# Patient Record
Sex: MALE | Race: WHITE | Employment: FULL TIME | URBAN - METROPOLITAN AREA
[De-identification: names, ages, dates, MRNs, and addresses within clinical notes are randomized per-mention and may not be internally consistent; named-entity substitution may affect disease eponyms.]

---

## 2023-09-18 ENCOUNTER — APPOINTMENT (OUTPATIENT)
Facility: HOSPITAL | Age: 58
DRG: 312 | End: 2023-09-18
Payer: OTHER GOVERNMENT

## 2023-09-18 ENCOUNTER — APPOINTMENT (OUTPATIENT)
Dept: VASCULAR SURGERY | Facility: HOSPITAL | Age: 58
DRG: 312 | End: 2023-09-18
Attending: INTERNAL MEDICINE
Payer: OTHER GOVERNMENT

## 2023-09-18 ENCOUNTER — HOSPITAL ENCOUNTER (INPATIENT)
Facility: HOSPITAL | Age: 58
LOS: 1 days | Discharge: HOME | DRG: 312 | End: 2023-09-19
Attending: STUDENT IN AN ORGANIZED HEALTH CARE EDUCATION/TRAINING PROGRAM | Admitting: INTERNAL MEDICINE
Payer: OTHER GOVERNMENT

## 2023-09-18 DIAGNOSIS — R42 VERTIGO: Primary | ICD-10-CM

## 2023-09-18 DIAGNOSIS — R55 NEAR SYNCOPE: ICD-10-CM

## 2023-09-18 DIAGNOSIS — R42 DIZZINESS: ICD-10-CM

## 2023-09-18 DIAGNOSIS — R26.2 AMBULATORY DYSFUNCTION: ICD-10-CM

## 2023-09-18 PROBLEM — I25.10 CAD (CORONARY ARTERY DISEASE): Status: ACTIVE | Noted: 2023-09-18

## 2023-09-18 LAB
ANION GAP SERPL CALC-SCNC: 13 MMOL/L (ref 5–15)
BASOPHILS # BLD: 0.1 K/UL (ref 0–1)
BASOPHILS NFR BLD: 1 % (ref 0–1)
BUN SERPL-MCNC: 11 MG/DL (ref 8–23)
BUN/CREAT SERPL: 9 (ref 12–20)
CALCIUM SERPL-MCNC: 9.9 MG/DL (ref 8.8–10.2)
CHLORIDE SERPL-SCNC: 104 MMOL/L (ref 98–107)
CO2 SERPL-SCNC: 24 MMOL/L (ref 22–29)
COMMENT:: NORMAL
CREAT SERPL-MCNC: 1.18 MG/DL (ref 0.7–1.2)
DIFFERENTIAL METHOD BLD: ABNORMAL
EOSINOPHIL # BLD: 0.3 K/UL (ref 0–0.4)
EOSINOPHIL NFR BLD: 2 % (ref 0–7)
ERYTHROCYTE [DISTWIDTH] IN BLOOD BY AUTOMATED COUNT: 12.7 % (ref 11.5–14.5)
GLUCOSE BLD STRIP.AUTO-MCNC: 95 MG/DL (ref 65–117)
GLUCOSE SERPL-MCNC: 100 MG/DL (ref 65–100)
HCT VFR BLD AUTO: 49.5 % (ref 36.6–50.3)
HGB BLD-MCNC: 17.4 G/DL (ref 12.1–17)
IMM GRANULOCYTES # BLD AUTO: 0 K/UL (ref 0–0.04)
IMM GRANULOCYTES NFR BLD AUTO: 0 % (ref 0–0.5)
LYMPHOCYTES # BLD: 2.7 K/UL (ref 0.8–3.5)
LYMPHOCYTES NFR BLD: 23 % (ref 12–49)
MCH RBC QN AUTO: 31.6 PG (ref 26–34)
MCHC RBC AUTO-ENTMCNC: 35.2 G/DL (ref 30–36.5)
MCV RBC AUTO: 89.8 FL (ref 80–99)
MONOCYTES # BLD: 0.8 K/UL (ref 0–1)
MONOCYTES NFR BLD: 6 % (ref 5–13)
NEUTS SEG # BLD: 7.9 K/UL (ref 1.8–8)
NEUTS SEG NFR BLD: 68 % (ref 32–75)
NRBC # BLD: 0 K/UL (ref 0–0.01)
NRBC BLD-RTO: 0 PER 100 WBC
PLATELET # BLD AUTO: 261 K/UL (ref 150–400)
PMV BLD AUTO: 9.1 FL (ref 8.9–12.9)
POTASSIUM SERPL-SCNC: 4 MMOL/L (ref 3.5–5.1)
RBC # BLD AUTO: 5.51 M/UL (ref 4.1–5.7)
SERVICE CMNT-IMP: NORMAL
SODIUM SERPL-SCNC: 141 MMOL/L (ref 136–145)
SPECIMEN HOLD: NORMAL
TROPONIN I BLD-MCNC: <0.04 NG/ML (ref 0–0.08)
TROPONIN I BLD-MCNC: <0.04 NG/ML (ref 0–0.08)
WBC # BLD AUTO: 11.7 K/UL (ref 4.1–11.1)

## 2023-09-18 PROCEDURE — 71046 X-RAY EXAM CHEST 2 VIEWS: CPT

## 2023-09-18 PROCEDURE — 85025 COMPLETE CBC W/AUTO DIFF WBC: CPT

## 2023-09-18 PROCEDURE — 84484 ASSAY OF TROPONIN QUANT: CPT

## 2023-09-18 PROCEDURE — 70551 MRI BRAIN STEM W/O DYE: CPT

## 2023-09-18 PROCEDURE — 80048 BASIC METABOLIC PNL TOTAL CA: CPT

## 2023-09-18 PROCEDURE — 2580000003 HC RX 258: Performed by: INTERNAL MEDICINE

## 2023-09-18 PROCEDURE — 82962 GLUCOSE BLOOD TEST: CPT

## 2023-09-18 PROCEDURE — 6360000004 HC RX CONTRAST MEDICATION: Performed by: STUDENT IN AN ORGANIZED HEALTH CARE EDUCATION/TRAINING PROGRAM

## 2023-09-18 PROCEDURE — 6370000000 HC RX 637 (ALT 250 FOR IP): Performed by: INTERNAL MEDICINE

## 2023-09-18 PROCEDURE — 2580000003 HC RX 258: Performed by: STUDENT IN AN ORGANIZED HEALTH CARE EDUCATION/TRAINING PROGRAM

## 2023-09-18 PROCEDURE — 70498 CT ANGIOGRAPHY NECK: CPT

## 2023-09-18 PROCEDURE — 6370000000 HC RX 637 (ALT 250 FOR IP): Performed by: STUDENT IN AN ORGANIZED HEALTH CARE EDUCATION/TRAINING PROGRAM

## 2023-09-18 PROCEDURE — 93005 ELECTROCARDIOGRAM TRACING: CPT | Performed by: STUDENT IN AN ORGANIZED HEALTH CARE EDUCATION/TRAINING PROGRAM

## 2023-09-18 PROCEDURE — 99285 EMERGENCY DEPT VISIT HI MDM: CPT

## 2023-09-18 PROCEDURE — 1100000000 HC RM PRIVATE

## 2023-09-18 PROCEDURE — 36415 COLL VENOUS BLD VENIPUNCTURE: CPT

## 2023-09-18 PROCEDURE — 70450 CT HEAD/BRAIN W/O DYE: CPT

## 2023-09-18 PROCEDURE — 6360000002 HC RX W HCPCS: Performed by: INTERNAL MEDICINE

## 2023-09-18 RX ORDER — ENOXAPARIN SODIUM 100 MG/ML
40 INJECTION SUBCUTANEOUS DAILY
Status: DISCONTINUED | OUTPATIENT
Start: 2023-09-18 | End: 2023-09-19 | Stop reason: HOSPADM

## 2023-09-18 RX ORDER — OXYCODONE HYDROCHLORIDE 5 MG/1
5 TABLET ORAL EVERY 4 HOURS PRN
Status: DISCONTINUED | OUTPATIENT
Start: 2023-09-18 | End: 2023-09-19 | Stop reason: HOSPADM

## 2023-09-18 RX ORDER — SODIUM CHLORIDE 0.9 % (FLUSH) 0.9 %
5-40 SYRINGE (ML) INJECTION PRN
Status: DISCONTINUED | OUTPATIENT
Start: 2023-09-18 | End: 2023-09-19 | Stop reason: HOSPADM

## 2023-09-18 RX ORDER — LISINOPRIL 10 MG/1
10 TABLET ORAL DAILY
COMMUNITY

## 2023-09-18 RX ORDER — LISINOPRIL 5 MG/1
10 TABLET ORAL DAILY
Status: DISCONTINUED | OUTPATIENT
Start: 2023-09-19 | End: 2023-09-19 | Stop reason: HOSPADM

## 2023-09-18 RX ORDER — PROCHLORPERAZINE EDISYLATE 5 MG/ML
10 INJECTION INTRAMUSCULAR; INTRAVENOUS EVERY 6 HOURS PRN
Status: DISCONTINUED | OUTPATIENT
Start: 2023-09-18 | End: 2023-09-19 | Stop reason: HOSPADM

## 2023-09-18 RX ORDER — METOPROLOL SUCCINATE 25 MG/1
25 TABLET, EXTENDED RELEASE ORAL DAILY
Status: DISCONTINUED | OUTPATIENT
Start: 2023-09-19 | End: 2023-09-19 | Stop reason: HOSPADM

## 2023-09-18 RX ORDER — ASPIRIN 81 MG/1
81 TABLET, CHEWABLE ORAL DAILY
Status: DISCONTINUED | OUTPATIENT
Start: 2023-09-18 | End: 2023-09-19 | Stop reason: HOSPADM

## 2023-09-18 RX ORDER — NITROGLYCERIN 0.4 MG/1
0.4 TABLET SUBLINGUAL EVERY 5 MIN PRN
COMMUNITY

## 2023-09-18 RX ORDER — SODIUM CHLORIDE 0.9 % (FLUSH) 0.9 %
5-40 SYRINGE (ML) INJECTION EVERY 12 HOURS SCHEDULED
Status: DISCONTINUED | OUTPATIENT
Start: 2023-09-18 | End: 2023-09-19 | Stop reason: HOSPADM

## 2023-09-18 RX ORDER — ACETAMINOPHEN 650 MG/1
650 SUPPOSITORY RECTAL EVERY 6 HOURS PRN
Status: DISCONTINUED | OUTPATIENT
Start: 2023-09-18 | End: 2023-09-19 | Stop reason: HOSPADM

## 2023-09-18 RX ORDER — METOPROLOL SUCCINATE 25 MG/1
25 TABLET, EXTENDED RELEASE ORAL DAILY
COMMUNITY

## 2023-09-18 RX ORDER — DIAZEPAM 5 MG/1
5 TABLET ORAL EVERY 6 HOURS PRN
Status: DISCONTINUED | OUTPATIENT
Start: 2023-09-18 | End: 2023-09-19 | Stop reason: HOSPADM

## 2023-09-18 RX ORDER — 0.9 % SODIUM CHLORIDE 0.9 %
1000 INTRAVENOUS SOLUTION INTRAVENOUS ONCE
Status: COMPLETED | OUTPATIENT
Start: 2023-09-18 | End: 2023-09-18

## 2023-09-18 RX ORDER — SODIUM CHLORIDE 9 MG/ML
INJECTION, SOLUTION INTRAVENOUS CONTINUOUS
Status: DISCONTINUED | OUTPATIENT
Start: 2023-09-18 | End: 2023-09-19 | Stop reason: HOSPADM

## 2023-09-18 RX ORDER — POLYETHYLENE GLYCOL 3350 17 G/17G
17 POWDER, FOR SOLUTION ORAL DAILY PRN
Status: DISCONTINUED | OUTPATIENT
Start: 2023-09-18 | End: 2023-09-19 | Stop reason: HOSPADM

## 2023-09-18 RX ORDER — ONDANSETRON 2 MG/ML
4 INJECTION INTRAMUSCULAR; INTRAVENOUS EVERY 6 HOURS PRN
Status: DISCONTINUED | OUTPATIENT
Start: 2023-09-18 | End: 2023-09-19 | Stop reason: HOSPADM

## 2023-09-18 RX ORDER — ATORVASTATIN CALCIUM 20 MG/1
40 TABLET, FILM COATED ORAL DAILY
Status: DISCONTINUED | OUTPATIENT
Start: 2023-09-18 | End: 2023-09-19 | Stop reason: HOSPADM

## 2023-09-18 RX ORDER — ACETAMINOPHEN 325 MG/1
650 TABLET ORAL EVERY 6 HOURS PRN
Status: DISCONTINUED | OUTPATIENT
Start: 2023-09-18 | End: 2023-09-19 | Stop reason: HOSPADM

## 2023-09-18 RX ORDER — MECLIZINE HYDROCHLORIDE 25 MG/1
25 TABLET ORAL
Status: COMPLETED | OUTPATIENT
Start: 2023-09-18 | End: 2023-09-18

## 2023-09-18 RX ORDER — ASPIRIN 81 MG/1
81 TABLET ORAL DAILY
COMMUNITY

## 2023-09-18 RX ORDER — ATORVASTATIN CALCIUM 40 MG/1
40 TABLET, FILM COATED ORAL DAILY
COMMUNITY

## 2023-09-18 RX ORDER — SENNA AND DOCUSATE SODIUM 50; 8.6 MG/1; MG/1
1 TABLET, FILM COATED ORAL 2 TIMES DAILY
Status: DISCONTINUED | OUTPATIENT
Start: 2023-09-18 | End: 2023-09-19 | Stop reason: HOSPADM

## 2023-09-18 RX ORDER — SODIUM CHLORIDE 9 MG/ML
INJECTION, SOLUTION INTRAVENOUS PRN
Status: DISCONTINUED | OUTPATIENT
Start: 2023-09-18 | End: 2023-09-19 | Stop reason: HOSPADM

## 2023-09-18 RX ADMIN — SODIUM CHLORIDE, PRESERVATIVE FREE 10 ML: 5 INJECTION INTRAVENOUS at 20:49

## 2023-09-18 RX ADMIN — SODIUM CHLORIDE 1000 ML: 9 INJECTION, SOLUTION INTRAVENOUS at 17:50

## 2023-09-18 RX ADMIN — MECLIZINE HYDROCHLORIDE 25 MG: 25 TABLET ORAL at 17:50

## 2023-09-18 RX ADMIN — IOPAMIDOL 100 ML: 755 INJECTION, SOLUTION INTRAVENOUS at 18:01

## 2023-09-18 RX ADMIN — SODIUM CHLORIDE: 9 INJECTION, SOLUTION INTRAVENOUS at 19:07

## 2023-09-18 RX ADMIN — ENOXAPARIN SODIUM 40 MG: 100 INJECTION SUBCUTANEOUS at 20:49

## 2023-09-18 RX ADMIN — SENNOSIDES AND DOCUSATE SODIUM 1 TABLET: 50; 8.6 TABLET ORAL at 20:49

## 2023-09-18 RX ADMIN — ASPIRIN 81 MG: 81 TABLET, CHEWABLE ORAL at 20:49

## 2023-09-18 RX ADMIN — ATORVASTATIN CALCIUM 40 MG: 20 TABLET, FILM COATED ORAL at 20:49

## 2023-09-18 ASSESSMENT — PAIN DESCRIPTION - DESCRIPTORS: DESCRIPTORS: ACHING

## 2023-09-18 ASSESSMENT — PAIN SCALES - GENERAL: PAINLEVEL_OUTOF10: 1

## 2023-09-18 ASSESSMENT — PAIN DESCRIPTION - ORIENTATION: ORIENTATION: POSTERIOR;LOWER

## 2023-09-18 ASSESSMENT — PAIN DESCRIPTION - PAIN TYPE: TYPE: ACUTE PAIN

## 2023-09-18 ASSESSMENT — LIFESTYLE VARIABLES
HOW MANY STANDARD DRINKS CONTAINING ALCOHOL DO YOU HAVE ON A TYPICAL DAY: PATIENT DOES NOT DRINK
HOW OFTEN DO YOU HAVE A DRINK CONTAINING ALCOHOL: NEVER

## 2023-09-18 ASSESSMENT — PAIN - FUNCTIONAL ASSESSMENT
PAIN_FUNCTIONAL_ASSESSMENT: 0-10
PAIN_FUNCTIONAL_ASSESSMENT: NONE - DENIES PAIN

## 2023-09-18 ASSESSMENT — PAIN DESCRIPTION - LOCATION: LOCATION: NECK

## 2023-09-18 NOTE — ED NOTES
Report given to YASMEEN Mak at [facility].       Angel Parker RN  09/18/23 5198

## 2023-09-18 NOTE — ED NOTES
Attempted to contact Dr. Cathy Pennington from cardiology at [phone].  stated \"Dr. Cathy Pennington is in meetings all day, there in no one else on call. \" Relayed message to MD. Suresh Rene, RN  09/18/23 4073

## 2023-09-18 NOTE — ED NOTES
Pt ambulated around unit with RN. Pt had episode of dizziness while ambulating and moving neck around. Pt ambulated back to room with RN at side, in stable condition. Vitals stable. MD made aware. Awaiting further orders.       Fozia Taylor RN  09/18/23 8870 (E4) spontaneous Información sobre la picadura de garrapatas en los adultos    Tick Bite Information, Adult       Las garrapatas son insectos que pueden picar. La mayoría vive en arbustos y en zonas de pastos. Se trepan a las personas y animales que pasan por allí. Luego, pican. Algunas garrapatas portan gérmenes que pueden enfermarlo.      ¿Cómo puedo evitar las picaduras de garrapatas?    Norco estas medidas:    Use repelente para insectos   •Use un repelente de insectos que contenga un 20 % o más de los ingredientes DEET, picaridina o [id number]. Siga las instrucciones en la etiqueta. Colóquelo sobre:  •La piel.      •La parte superior de angeles botas.      •Las botamangas de los pantalones.      •Los extremos de angeles mangas.      •Si usa un repelente de insectos que contiene el ingrediente permetrina, siga las instrucciones en la etiqueta. Colóquelo sobre:  •Vestimenta.      •Botas.      •Suministros o equipos para el aire farhat.      •Tiendas de campaña o toldos.        Cuando esté al aire farhat     •Use mangas largas y pantalones largos.      •Use ropa de colores peace.      •Coloque las botamangas de angeles pantalones dentro de los calcetines.      •Permanezca en el medio del sendero. No toque los arbustos.      •Evite caminar entre pastos altos.      •Controle con frecuencia que no tenga garrapatas en la ropa, el pelo ni la piel mientras esté al aire farhat. Antes de entrar a calix casa, revísese la ropa, la piel, la nga, el rolan, las axilas, la cintura, la alie y las zonas de las articulaciones.      Cuando regrese a interiores     •Revise la ropa para hanny si tiene garrapatas. Seque la ropa en un secador a filemon temperatura elian 10 minutos o más. Si la ropa está húmeda, quizás sea necesario secarla más tiempo.      •Lave la ropa de inmediato si requiere lavado. Utilice Lummi.      •Revise angeles mascotas y los equipos para el aire farhat.      •Dúchese de inmediato.      •Revísese el cuerpo en busca de garrapatas. Revísese todo el cuerpo con un antoinette.        ¿Cuál es el modo correcto de retirar joesph garrapata?     Retire la garrapata de la piel lo más pronto posible. No retire la garrapata directamente con los dedos.•Para retirar joesph garrapata que está trepándose por la piel:  •Salga afuera y retire la garrapata con un cepillo.      •Use joesph cinta adhesiva o un rodillo nikhil pelusas.      •Para retirar joesph garrapata que está picando:  1.Que se lave las sonido.      2.Si tiene guantes de látex, póngaselos.      3.Use joesph pinza de wendy, joesph pinza curva o joesph herramienta para retirar garrapatas a fin de sujetar la garrapata. Sujete la garrapata lo más cerca posible de la piel y lo más cerca posible de la nga de la garrapata.    4.Jale suavemente hasta que la garrapata se desprenda.  •Trate de mantener la nga de la garrapata unida al cuerpo.      •No la retuerza ni la sacuda.      •No la apriete ni la aplaste.          No intente quitar joesph garrapata con calor, alcohol, vaselina ni esmalte de uñas.      ¿Qué nataliia hacer después de quitar joesph garrapata?    •Deseche la garrapata. No aplaste la garrapata con los dedos.      •Limpie la partha de la picadura y angeles sonido con aguja y jabón, con alcohol para uso externo o con joesph solución yodada.      •Si tiene joesph crema o ungüento antiséptico, aplique joesph pequeña cantidad en la partha de la picadura.      •Lave y desinfecte todos los instrumentos que utilizó para quitar la garrapata.        ¿Cómo me deshago de joesph garrapata viva?    Para desechar joesph garrapata viva, use oliver de estos métodos:  •Coloque la garrapata en alcohol isopropílico.      •Coloque la garrapata en joesph bolsa o un recipiente que pueda cerrar herméticamente.      •Envuelva la garrapata en joesph cinta adhesiva stone apretada.      •Tire la garrapata por el inodoro.        Comuníquese con un médico si:  •Tiene síntomas, ana lilia:  •Fiebre o escalofríos.      •Presenta joesph erupción cutánea leonora que forma un círculo (erupción briseyda de buey) en la partha de la picadura.      •Presenta enrojecimiento e hinchazón en el lugar donde lo picó la garrapata.      •Dolor de nga.      •Dolor muscular, articular u óseo.      •Presenta un cansancio físico mayor de lo habitual.      •Tiene dificultad para caminar o  las piernas.      •Presenta entumecimiento en las piernas.      •Ganglios linfáticos inflamados y sensibles al tacto.        •Parte de joesph garrapata se rompe y queda atascada en la piel.        Solicite ayuda de inmediato si:    •No puede retirar joesph garrapata.      •No puede moverse (tiene parálisis) o se siente débil.      •Se siente peor o tiene síntomas nuevos.      •Encuentra joesph garrapata que lo está picando y está llena de nanette. McCarthy es importante si se encuentra en joesph partha donde las enfermedades transmitidas por las garrapatas son frecuentes.        Resumen    •Es posible que las garrapatas porten gérmenes que pueden enfermarlo.      •Para evitar picaduras de garrapatas, use mangas largas, pantalones largos y colores peace. Use repelente para insectos. Siga las instrucciones en la etiqueta.      •Si la garrapata lo está picando, no intente retirarla con calor, alcohol, vaselina ni esmalte de uñas.      •Use joesph pinza de wendy, joesph pinza curva o joesph herramienta para retirar garrapatas a fin de sujetar la garrapata. Jale suavemente hasta que la garrapata se desprenda. No la retuerza ni la sacuda. No la apriete ni la aplaste.      •Si tiene síntomas, comuníquese con un médico.      Esta información no tiene ana lilia fin reemplazar el consejo del médico. Asegúrese de hacerle al médico cualquier pregunta que tenga.

## 2023-09-18 NOTE — ED TRIAGE NOTES
Pt ambulatory into ER with cc of near syncopal event after being over with dizziness, nausea and sweating. Pt reports similar event that occurred last Wednesday. Pt reports being able to reproduce the dizziness if he bends his neck in a particular way. Pt denies CP or SOB. Pt reports hx of MI with similar symptoms. Ney OLIVER at bedside at [phone].

## 2023-09-18 NOTE — ED NOTES
Report given to [facility] crew at this time. Pt to be transported in stable condition to [facility] ER at this time. Pt being transported with NS bolus infusing and NS maintenance infusion with [facility] paramedic, to be started at 75ml/hr when bolus complete.       Shahla Galicia RN  09/18/23 2681

## 2023-09-18 NOTE — ED PROVIDER NOTES
HPI    59 y.o. male presenting with dizziness. He states that he was in his usual state of health, got out of his car and upon standing up he had sudden onset of lightheadedness and whirling vertigo, had severe diaphoresis and nausea initially but did not have emesis. Notes that the dizziness is much worse when he turns his head in certain directions especially lifting his head up and turning from side to side. He has had symptoms for about 3 hours now. Denies neck pain, headache, chest pain or dyspnea. He states that he had \"an MI\" in 2008 or 2009 which was treated at an outside hospital in which she had an episode of dizziness and was taken to the hospital, had \"normal EKG and blood work\" but his doctor did a angiogram and found \"2 blockages, one was 85% and 1 was more than 90%\". He has not had other cardiac issues since. He denies history of diabetes. He has not had any head trauma. States that the symptoms are much improved now. he  has a past medical history of CAD (coronary artery disease), MI, old, and Vertigo. Physical Exam  Vitals reviewed. Constitutional:       General: He is not in acute distress. Appearance: Normal appearance. HENT:      Head: Normocephalic. Mouth/Throat:      Mouth: Mucous membranes are moist.   Eyes:      Extraocular Movements: Extraocular movements intact. Pupils: Pupils are equal, round, and reactive to light. Cardiovascular:      Rate and Rhythm: Normal rate and regular rhythm. Pulses: Normal pulses. Pulmonary:      Effort: Pulmonary effort is normal. No respiratory distress. Abdominal:      General: Abdomen is flat. Musculoskeletal:      Cervical back: Neck supple. No rigidity. Right lower leg: No edema. Left lower leg: No edema. Skin:     General: Skin is warm. Capillary Refill: Capillary refill takes less than 2 seconds. Neurological:      General: No focal deficit present. Mental Status: He is alert.

## 2023-09-19 ENCOUNTER — APPOINTMENT (OUTPATIENT)
Facility: HOSPITAL | Age: 58
DRG: 312 | End: 2023-09-19
Payer: OTHER GOVERNMENT

## 2023-09-19 ENCOUNTER — APPOINTMENT (OUTPATIENT)
Facility: HOSPITAL | Age: 58
DRG: 312 | End: 2023-09-19
Attending: INTERNAL MEDICINE
Payer: OTHER GOVERNMENT

## 2023-09-19 ENCOUNTER — APPOINTMENT (OUTPATIENT)
Dept: VASCULAR SURGERY | Facility: HOSPITAL | Age: 58
DRG: 312 | End: 2023-09-19
Attending: INTERNAL MEDICINE
Payer: OTHER GOVERNMENT

## 2023-09-19 VITALS
DIASTOLIC BLOOD PRESSURE: 68 MMHG | TEMPERATURE: 98.1 F | WEIGHT: 180 LBS | OXYGEN SATURATION: 96 % | BODY MASS INDEX: 24.38 KG/M2 | HEART RATE: 56 BPM | RESPIRATION RATE: 18 BRPM | HEIGHT: 72 IN | SYSTOLIC BLOOD PRESSURE: 139 MMHG

## 2023-09-19 LAB
ALBUMIN SERPL-MCNC: 3.3 G/DL (ref 3.5–5)
ALBUMIN/GLOB SERPL: 1.1 (ref 1.1–2.2)
ALP SERPL-CCNC: 64 U/L (ref 45–117)
ALT SERPL-CCNC: 35 U/L (ref 12–78)
ANION GAP SERPL CALC-SCNC: 5 MMOL/L (ref 5–15)
AST SERPL-CCNC: 21 U/L (ref 15–37)
BASOPHILS # BLD: 0.1 K/UL (ref 0–0.1)
BASOPHILS NFR BLD: 1 % (ref 0–1)
BILIRUB SERPL-MCNC: 0.8 MG/DL (ref 0.2–1)
BUN SERPL-MCNC: 12 MG/DL (ref 6–20)
BUN/CREAT SERPL: 10 (ref 12–20)
CALCIUM SERPL-MCNC: 8.7 MG/DL (ref 8.5–10.1)
CHLORIDE SERPL-SCNC: 111 MMOL/L (ref 97–108)
CHOLEST SERPL-MCNC: 89 MG/DL
CK SERPL-CCNC: 56 U/L (ref 39–308)
CO2 SERPL-SCNC: 27 MMOL/L (ref 21–32)
COMMENT:: NORMAL
CREAT SERPL-MCNC: 1.25 MG/DL (ref 0.7–1.3)
DIFFERENTIAL METHOD BLD: NORMAL
ECHO AV AREA PEAK VELOCITY: 3.4 CM2
ECHO AV AREA VTI: 3.1 CM2
ECHO AV AREA/BSA PEAK VELOCITY: 1.7 CM2/M2
ECHO AV AREA/BSA VTI: 1.5 CM2/M2
ECHO AV MEAN GRADIENT: 4 MMHG
ECHO AV MEAN VELOCITY: 0.9 M/S
ECHO AV PEAK GRADIENT: 7 MMHG
ECHO AV PEAK VELOCITY: 1.4 M/S
ECHO AV VELOCITY RATIO: 0.93
ECHO AV VTI: 29.4 CM
ECHO BSA: 2.04 M2
ECHO BSA: 2.04 M2
ECHO LV EDV A2C: 87 ML
ECHO LV EDV A4C: 113 ML
ECHO LV EDV BP: 100 ML (ref 67–155)
ECHO LV EDV INDEX A4C: 55 ML/M2
ECHO LV EDV INDEX BP: 49 ML/M2
ECHO LV EDV NDEX A2C: 43 ML/M2
ECHO LV EJECTION FRACTION A2C: 71 %
ECHO LV EJECTION FRACTION A4C: 68 %
ECHO LV EJECTION FRACTION BIPLANE: 70 % (ref 55–100)
ECHO LV ESV A2C: 25 ML
ECHO LV ESV A4C: 36 ML
ECHO LV ESV BP: 30 ML (ref 22–58)
ECHO LV ESV INDEX A2C: 12 ML/M2
ECHO LV ESV INDEX A4C: 18 ML/M2
ECHO LV ESV INDEX BP: 15 ML/M2
ECHO LV FRACTIONAL SHORTENING: 30 % (ref 28–44)
ECHO LV INTERNAL DIMENSION DIASTOLE INDEX: 2.3 CM/M2
ECHO LV INTERNAL DIMENSION DIASTOLIC: 4.7 CM (ref 4.2–5.9)
ECHO LV INTERNAL DIMENSION SYSTOLIC INDEX: 1.62 CM/M2
ECHO LV INTERNAL DIMENSION SYSTOLIC: 3.3 CM
ECHO LV IVSD: 1.1 CM (ref 0.6–1)
ECHO LV MASS 2D: 175.8 G (ref 88–224)
ECHO LV MASS INDEX 2D: 86.2 G/M2 (ref 49–115)
ECHO LV POSTERIOR WALL DIASTOLIC: 1 CM (ref 0.6–1)
ECHO LV RELATIVE WALL THICKNESS RATIO: 0.43
ECHO LVOT AREA: 3.5 CM2
ECHO LVOT AV VTI INDEX: 0.88
ECHO LVOT DIAM: 2.1 CM
ECHO LVOT MEAN GRADIENT: 3 MMHG
ECHO LVOT PEAK GRADIENT: 7 MMHG
ECHO LVOT PEAK VELOCITY: 1.3 M/S
ECHO LVOT STROKE VOLUME INDEX: 44.1 ML/M2
ECHO LVOT SV: 90 ML
ECHO LVOT VTI: 26 CM
EOSINOPHIL # BLD: 0.4 K/UL (ref 0–0.4)
EOSINOPHIL NFR BLD: 5 % (ref 0–7)
ERYTHROCYTE [DISTWIDTH] IN BLOOD BY AUTOMATED COUNT: 12.7 % (ref 11.5–14.5)
EST. AVERAGE GLUCOSE BLD GHB EST-MCNC: 103 MG/DL
GLOBULIN SER CALC-MCNC: 3.1 G/DL (ref 2–4)
GLUCOSE SERPL-MCNC: 102 MG/DL (ref 65–100)
HBA1C MFR BLD: 5.2 % (ref 4–5.6)
HCT VFR BLD AUTO: 44.3 % (ref 36.6–50.3)
HDLC SERPL-MCNC: 38 MG/DL
HDLC SERPL: 2.3 (ref 0–5)
HGB BLD-MCNC: 15.2 G/DL (ref 12.1–17)
IMM GRANULOCYTES # BLD AUTO: 0 K/UL (ref 0–0.04)
IMM GRANULOCYTES NFR BLD AUTO: 0 % (ref 0–0.5)
LDLC SERPL CALC-MCNC: 27.4 MG/DL (ref 0–100)
LYMPHOCYTES # BLD: 3 K/UL (ref 0.8–3.5)
LYMPHOCYTES NFR BLD: 35 % (ref 12–49)
MAGNESIUM SERPL-MCNC: 2.1 MG/DL (ref 1.6–2.4)
MCH RBC QN AUTO: 31.3 PG (ref 26–34)
MCHC RBC AUTO-ENTMCNC: 34.3 G/DL (ref 30–36.5)
MCV RBC AUTO: 91.2 FL (ref 80–99)
MONOCYTES # BLD: 0.7 K/UL (ref 0–1)
MONOCYTES NFR BLD: 8 % (ref 5–13)
NEUTS SEG # BLD: 4.3 K/UL (ref 1.8–8)
NEUTS SEG NFR BLD: 51 % (ref 32–75)
NRBC # BLD: 0 K/UL (ref 0–0.01)
NRBC BLD-RTO: 0 PER 100 WBC
NT PRO BNP: 39 PG/ML
NUC STRESS EJECTION FRACTION: 69 %
PHOSPHATE SERPL-MCNC: 3.2 MG/DL (ref 2.6–4.7)
PLATELET # BLD AUTO: 217 K/UL (ref 150–400)
PMV BLD AUTO: 9.3 FL (ref 8.9–12.9)
POTASSIUM SERPL-SCNC: 4.4 MMOL/L (ref 3.5–5.1)
PROT SERPL-MCNC: 6.4 G/DL (ref 6.4–8.2)
RBC # BLD AUTO: 4.86 M/UL (ref 4.1–5.7)
SODIUM SERPL-SCNC: 143 MMOL/L (ref 136–145)
SPECIMEN HOLD: NORMAL
STRESS BASELINE DIAS BP: 77 MMHG
STRESS BASELINE HR: 53 BPM
STRESS BASELINE ST DEPRESSION: 0 MM
STRESS BASELINE SYS BP: 134 MMHG
STRESS ESTIMATED WORKLOAD: 1 METS
STRESS PEAK DIAS BP: 69 MMHG
STRESS PEAK SYS BP: 150 MMHG
STRESS PERCENT HR ACHIEVED: 65 %
STRESS POST PEAK HR: 104 BPM
STRESS RATE PRESSURE PRODUCT: NORMAL BPM*MMHG
STRESS TARGET HR: 160 BPM
TRIGL SERPL-MCNC: 118 MG/DL
TROPONIN I SERPL HS-MCNC: 6 NG/L (ref 0–76)
TSH SERPL DL<=0.05 MIU/L-ACNC: 5.5 UIU/ML (ref 0.36–3.74)
VLDLC SERPL CALC-MCNC: 23.6 MG/DL
WBC # BLD AUTO: 8.4 K/UL (ref 4.1–11.1)

## 2023-09-19 PROCEDURE — 83880 ASSAY OF NATRIURETIC PEPTIDE: CPT

## 2023-09-19 PROCEDURE — 97116 GAIT TRAINING THERAPY: CPT

## 2023-09-19 PROCEDURE — 93017 CV STRESS TEST TRACING ONLY: CPT

## 2023-09-19 PROCEDURE — APPSS30 APP SPLIT SHARED TIME 16-30 MINUTES: Performed by: NURSE PRACTITIONER

## 2023-09-19 PROCEDURE — 97530 THERAPEUTIC ACTIVITIES: CPT

## 2023-09-19 PROCEDURE — 94761 N-INVAS EAR/PLS OXIMETRY MLT: CPT

## 2023-09-19 PROCEDURE — 93880 EXTRACRANIAL BILAT STUDY: CPT

## 2023-09-19 PROCEDURE — 6360000002 HC RX W HCPCS

## 2023-09-19 PROCEDURE — 78452 HT MUSCLE IMAGE SPECT MULT: CPT

## 2023-09-19 PROCEDURE — 84443 ASSAY THYROID STIM HORMONE: CPT

## 2023-09-19 PROCEDURE — 99223 1ST HOSP IP/OBS HIGH 75: CPT | Performed by: STUDENT IN AN ORGANIZED HEALTH CARE EDUCATION/TRAINING PROGRAM

## 2023-09-19 PROCEDURE — 80061 LIPID PANEL: CPT

## 2023-09-19 PROCEDURE — 36415 COLL VENOUS BLD VENIPUNCTURE: CPT

## 2023-09-19 PROCEDURE — 83036 HEMOGLOBIN GLYCOSYLATED A1C: CPT

## 2023-09-19 PROCEDURE — 2580000003 HC RX 258: Performed by: INTERNAL MEDICINE

## 2023-09-19 PROCEDURE — 83735 ASSAY OF MAGNESIUM: CPT

## 2023-09-19 PROCEDURE — 80053 COMPREHEN METABOLIC PANEL: CPT

## 2023-09-19 PROCEDURE — 97165 OT EVAL LOW COMPLEX 30 MIN: CPT

## 2023-09-19 PROCEDURE — 97161 PT EVAL LOW COMPLEX 20 MIN: CPT

## 2023-09-19 PROCEDURE — 85025 COMPLETE CBC W/AUTO DIFF WBC: CPT

## 2023-09-19 PROCEDURE — A9500 TC99M SESTAMIBI: HCPCS | Performed by: NURSE PRACTITIONER

## 2023-09-19 PROCEDURE — 93306 TTE W/DOPPLER COMPLETE: CPT

## 2023-09-19 PROCEDURE — 84484 ASSAY OF TROPONIN QUANT: CPT

## 2023-09-19 PROCEDURE — 82550 ASSAY OF CK (CPK): CPT

## 2023-09-19 PROCEDURE — 84100 ASSAY OF PHOSPHORUS: CPT

## 2023-09-19 PROCEDURE — 6370000000 HC RX 637 (ALT 250 FOR IP): Performed by: INTERNAL MEDICINE

## 2023-09-19 PROCEDURE — 3430000000 HC RX DIAGNOSTIC RADIOPHARMACEUTICAL: Performed by: NURSE PRACTITIONER

## 2023-09-19 RX ORDER — TETRAKIS(2-METHOXYISOBUTYLISOCYANIDE)COPPER(I) TETRAFLUOROBORATE 1 MG/ML
29 INJECTION, POWDER, LYOPHILIZED, FOR SOLUTION INTRAVENOUS
Status: COMPLETED | OUTPATIENT
Start: 2023-09-19 | End: 2023-09-19

## 2023-09-19 RX ORDER — REGADENOSON 0.08 MG/ML
INJECTION, SOLUTION INTRAVENOUS
Status: COMPLETED
Start: 2023-09-19 | End: 2023-09-19

## 2023-09-19 RX ORDER — REGADENOSON 0.08 MG/ML
0.4 INJECTION, SOLUTION INTRAVENOUS ONCE
Status: COMPLETED | OUTPATIENT
Start: 2023-09-19 | End: 2023-09-19

## 2023-09-19 RX ORDER — TETRAKIS(2-METHOXYISOBUTYLISOCYANIDE)COPPER(I) TETRAFLUOROBORATE 1 MG/ML
9.8 INJECTION, POWDER, LYOPHILIZED, FOR SOLUTION INTRAVENOUS ONCE
Status: COMPLETED | OUTPATIENT
Start: 2023-09-19 | End: 2023-09-19

## 2023-09-19 RX ADMIN — TETRAKIS(2-METHOXYISOBUTYLISOCYANIDE)COPPER(I) TETRAFLUOROBORATE 9.8 MILLICURIE: 1 INJECTION, POWDER, LYOPHILIZED, FOR SOLUTION INTRAVENOUS at 09:39

## 2023-09-19 RX ADMIN — REGADENOSON 0.4 MG: 0.08 INJECTION, SOLUTION INTRAVENOUS at 11:16

## 2023-09-19 RX ADMIN — ATORVASTATIN CALCIUM 40 MG: 20 TABLET, FILM COATED ORAL at 10:11

## 2023-09-19 RX ADMIN — SODIUM CHLORIDE: 9 INJECTION, SOLUTION INTRAVENOUS at 13:41

## 2023-09-19 RX ADMIN — TETRAKIS(2-METHOXYISOBUTYLISOCYANIDE)COPPER(I) TETRAFLUOROBORATE 29 MILLICURIE: 1 INJECTION, POWDER, LYOPHILIZED, FOR SOLUTION INTRAVENOUS at 11:17

## 2023-09-19 RX ADMIN — SODIUM CHLORIDE, PRESERVATIVE FREE 10 ML: 5 INJECTION INTRAVENOUS at 10:13

## 2023-09-19 RX ADMIN — LISINOPRIL 10 MG: 5 TABLET ORAL at 10:11

## 2023-09-19 RX ADMIN — ASPIRIN 81 MG: 81 TABLET, CHEWABLE ORAL at 10:12

## 2023-09-19 RX ADMIN — METOPROLOL SUCCINATE 25 MG: 25 TABLET, EXTENDED RELEASE ORAL at 10:11

## 2023-09-19 NOTE — DISCHARGE INSTRUCTIONS
You came to the hospital with lightheadedness. You had an MRI of your brain that did not show a stroke. You were seen by neurology nurse practitioner.  You had a stress test of your heart which was normal. Please continue your medications and follow up with PCP

## 2023-09-19 NOTE — DISCHARGE SUMMARY
Discharge Summary   Please note that this dictation was completed with [employer], the computer voice recognition software. Quite often unanticipated grammatical, syntax, homophones, and other interpretive errors are inadvertently transcribed by the computer software. Please disregard these errors. Please excuse any errors that have escaped final proofreading. PATIENT ID: Marjan Coles  MRN: [id number]   YOB: 1965    DATE OF ADMISSION: 9/18/2023  2:53 PM    DATE OF DISCHARGE: 9/19/2023  PRIMARY CARE PROVIDER: No primary care provider on file. ATTENDING PHYSICIAN: Victor Manuel Jin MD  DISCHARGING PROVIDER: Victor Manuel Jin MD       CONSULTATIONS: IP CONSULT TO CARDIOLOGY  IP CONSULT TO NEUROLOGY    PROCEDURES/SURGERIES: * No surgery found *    ADMITTING HPI from excerpted H&P   60 yo hx of CAD, presented w/ dizziness, vertigo. The patient c/o intermittent dizziness since last week. He stated that the dizziness was associated with \"room spinning\", nausea, and diffused diaphoresis. Denied chest pain, syncope, fevers, chills, dysarthria, or focal neuro deficits. In the ED, valium helped with symptoms. Head CT was unremarkable. HOSPITAL COURSE & DISCHARGE DIAGNOSIS/ PLAN:     Dizziness  CAD s/p PCI in 2009   Suspect cardiac etiology over neurological. Head CT neg. MRI Brain negative. cardiology was consulted, lexican stress test was negative. continued ASA, Statin, Beta blockers        DISCHARGE MEDICATIONS:     Medication List        CONTINUE taking these medications      aspirin 81 MG EC tablet     atorvastatin 40 MG tablet  Commonly known as: LIPITOR     lisinopril 10 MG tablet  Commonly known as: PRINIVIL;ZESTRIL     metoprolol succinate 25 MG extended release tablet  Commonly known as: TOPROL XL     nitroGLYCERIN 0.4 MG SL tablet  Commonly known as: NITROSTAT                NOTIFY YOUR PHYSICIAN FOR ANY OF THE FOLLOWING:   Fever over 101 degrees for 24 hours.    Chest

## 2023-09-19 NOTE — ED NOTES
TRANSFER - OUT REPORT:    Verbal report given to [address] [place] [address] on [facility]  being transferred to  for routine progression of patient care       Report consisted of patient's Situation, Background, Assessment and   Recommendations(SBAR). Information from the following report(s) ED Encounter Summary, ED SBAR, [facility], and Recent Results was reviewed with the receiving nurse. [facility] Assessment:    Presents to emergency department  because of falls (Syncope, seizure, or loss of consciousness): No  Age > 70: No  Altered Mental Status, Intoxication with alcohol or substance confusion (Disorientation, impaired judgment, poor safety awaremess, or inability to follow instructions): No  Impaired Mobility: Ambulates or transfers with assistive devices or assistance; Unable to ambulate or transer.: No  Nursing Judgement: No          Lines:   Peripheral IV 09/18/23 Left Antecubital (Active)   Site Assessment Clean, dry & intact 09/18/23 1511   Line Status Blood return noted 09/18/23 1511   Phlebitis Assessment No symptoms 09/18/23 1511   Infiltration Assessment 0 09/18/23 1511        Opportunity for questions and clarification was provided.                  Carlos Gudino, RN  09/18/23 1857

## 2023-09-20 LAB
ECHO BSA: 2.04 M2
EKG ATRIAL RATE: 55 BPM
EKG DIAGNOSIS: NORMAL
EKG P AXIS: 55 DEGREES
EKG P-R INTERVAL: 178 MS
EKG Q-T INTERVAL: 424 MS
EKG QRS DURATION: 88 MS
EKG QTC CALCULATION (BAZETT): 405 MS
EKG R AXIS: 48 DEGREES
EKG T AXIS: 44 DEGREES
EKG VENTRICULAR RATE: 55 BPM
VAS LEFT CCA DIST EDV: 16.4 CM/S
VAS LEFT CCA DIST PSV: 68 CM/S
VAS LEFT CCA PROX EDV: 19.3 CM/S
VAS LEFT CCA PROX PSV: 112.5 CM/S
VAS LEFT ECA EDV: 9.12 CM/S
VAS LEFT ECA PSV: 113.5 CM/S
VAS LEFT ICA DIST EDV: 21.2 CM/S
VAS LEFT ICA DIST PSV: 80.6 CM/S
VAS LEFT ICA MID EDV: 26.7 CM/S
VAS LEFT ICA MID PSV: 82.8 CM/S
VAS LEFT ICA PROX EDV: 26.7 CM/S
VAS LEFT ICA PROX PSV: 77.3 CM/S
VAS LEFT ICA/CCA PSV: 1.22 NO UNITS
VAS LEFT SUBCLAVIAN PROX EDV: 0 CM/S
VAS LEFT SUBCLAVIAN PROX PSV: 197.2 CM/S
VAS LEFT VERTEBRAL EDV: 10.03 CM/S
VAS LEFT VERTEBRAL PSV: 47.8 CM/S
VAS RIGHT CCA DIST EDV: 13 CM/S
VAS RIGHT CCA DIST PSV: 61.9 CM/S
VAS RIGHT CCA PROX EDV: 19.2 CM/S
VAS RIGHT CCA PROX PSV: 101.6 CM/S
VAS RIGHT ECA EDV: 12.76 CM/S
VAS RIGHT ECA PSV: 143.6 CM/S
VAS RIGHT ICA DIST EDV: 27.1 CM/S
VAS RIGHT ICA DIST PSV: 81.4 CM/S
VAS RIGHT ICA MID EDV: 29.6 CM/S
VAS RIGHT ICA MID PSV: 94.3 CM/S
VAS RIGHT ICA PROX EDV: 17.4 CM/S
VAS RIGHT ICA PROX PSV: 58.4 CM/S
VAS RIGHT ICA/CCA PSV: 1.5 NO UNITS
VAS RIGHT SUBCLAVIAN PROX EDV: 0 CM/S
VAS RIGHT SUBCLAVIAN PROX PSV: 112.9 CM/S
VAS RIGHT VERTEBRAL EDV: 8.71 CM/S
VAS RIGHT VERTEBRAL PSV: 40.5 CM/S

## 2023-09-20 PROCEDURE — 93010 ELECTROCARDIOGRAM REPORT: CPT | Performed by: STUDENT IN AN ORGANIZED HEALTH CARE EDUCATION/TRAINING PROGRAM
